# Patient Record
Sex: FEMALE | Race: WHITE | NOT HISPANIC OR LATINO | Employment: FULL TIME | URBAN - METROPOLITAN AREA
[De-identification: names, ages, dates, MRNs, and addresses within clinical notes are randomized per-mention and may not be internally consistent; named-entity substitution may affect disease eponyms.]

---

## 2018-03-07 ENCOUNTER — HOSPITAL ENCOUNTER (INPATIENT)
Facility: HOSPITAL | Age: 68
LOS: 1 days | Discharge: HOME | End: 2018-03-08
Attending: EMERGENCY MEDICINE | Admitting: INTERNAL MEDICINE

## 2018-03-07 ENCOUNTER — APPOINTMENT (OUTPATIENT)
Dept: CT IMAGING | Facility: HOSPITAL | Age: 68
End: 2018-03-07

## 2018-03-07 ENCOUNTER — APPOINTMENT (OUTPATIENT)
Dept: GENERAL RADIOLOGY | Facility: HOSPITAL | Age: 68
End: 2018-03-07

## 2018-03-07 ENCOUNTER — APPOINTMENT (OUTPATIENT)
Dept: CARDIOLOGY | Facility: HOSPITAL | Age: 68
End: 2018-03-07
Attending: INTERNAL MEDICINE

## 2018-03-07 ENCOUNTER — APPOINTMENT (OUTPATIENT)
Dept: CT IMAGING | Facility: HOSPITAL | Age: 68
End: 2018-03-07
Attending: INTERNAL MEDICINE

## 2018-03-07 DIAGNOSIS — I60.9 SUBARACHNOID BLEED (HCC): ICD-10-CM

## 2018-03-07 DIAGNOSIS — R55 SYNCOPE, CARDIOGENIC: Primary | ICD-10-CM

## 2018-03-07 PROBLEM — S06.6X1A TRAUMATIC SUBARACHNOID HEMORRHAGE WITH LOSS OF CONSCIOUSNESS OF 30 MINUTES OR LESS (HCC): Status: ACTIVE | Noted: 2018-03-07

## 2018-03-07 LAB
ALBUMIN SERPL-MCNC: 4.1 G/DL (ref 3.5–5.2)
ALBUMIN/GLOB SERPL: 1.5 G/DL
ALP SERPL-CCNC: 57 U/L (ref 39–117)
ALT SERPL W P-5'-P-CCNC: 26 U/L (ref 1–33)
ANION GAP SERPL CALCULATED.3IONS-SCNC: 9 MMOL/L
AST SERPL-CCNC: 23 U/L (ref 1–32)
BASOPHILS # BLD AUTO: 0.01 10*3/MM3 (ref 0–0.2)
BASOPHILS NFR BLD AUTO: 0.1 % (ref 0–1.5)
BILIRUB SERPL-MCNC: 0.5 MG/DL (ref 0.1–1.2)
BILIRUB UR QL STRIP: NEGATIVE
BUN BLD-MCNC: 18 MG/DL (ref 8–23)
BUN/CREAT SERPL: 25 (ref 7–25)
CALCIUM SPEC-SCNC: 9 MG/DL (ref 8.6–10.5)
CHLORIDE SERPL-SCNC: 105 MMOL/L (ref 98–107)
CLARITY UR: ABNORMAL
CO2 SERPL-SCNC: 28 MMOL/L (ref 22–29)
COLOR UR: YELLOW
CREAT BLD-MCNC: 0.72 MG/DL (ref 0.57–1)
D DIMER PPP FEU-MCNC: 2.57 MCGFEU/ML (ref 0–0.49)
DEPRECATED RDW RBC AUTO: 46.3 FL (ref 37–54)
EOSINOPHIL # BLD AUTO: 0.06 10*3/MM3 (ref 0–0.7)
EOSINOPHIL NFR BLD AUTO: 0.8 % (ref 0.3–6.2)
ERYTHROCYTE [DISTWIDTH] IN BLOOD BY AUTOMATED COUNT: 13.6 % (ref 11.7–13)
GFR SERPL CREATININE-BSD FRML MDRD: 81 ML/MIN/1.73
GLOBULIN UR ELPH-MCNC: 2.7 GM/DL
GLUCOSE BLD-MCNC: 105 MG/DL (ref 65–99)
GLUCOSE BLDC GLUCOMTR-MCNC: 102 MG/DL (ref 70–130)
GLUCOSE BLDC GLUCOMTR-MCNC: 121 MG/DL (ref 70–130)
GLUCOSE BLDC GLUCOMTR-MCNC: 87 MG/DL (ref 70–130)
GLUCOSE BLDC GLUCOMTR-MCNC: 88 MG/DL (ref 70–130)
GLUCOSE BLDC GLUCOMTR-MCNC: 98 MG/DL (ref 70–130)
GLUCOSE UR STRIP-MCNC: NEGATIVE MG/DL
HCT VFR BLD AUTO: 42.7 % (ref 35.6–45.5)
HGB BLD-MCNC: 13.6 G/DL (ref 11.9–15.5)
HGB UR QL STRIP.AUTO: NEGATIVE
IMM GRANULOCYTES # BLD: 0 10*3/MM3 (ref 0–0.03)
IMM GRANULOCYTES NFR BLD: 0 % (ref 0–0.5)
KETONES UR QL STRIP: NEGATIVE
LEUKOCYTE ESTERASE UR QL STRIP.AUTO: NEGATIVE
LYMPHOCYTES # BLD AUTO: 0.87 10*3/MM3 (ref 0.9–4.8)
LYMPHOCYTES NFR BLD AUTO: 11.3 % (ref 19.6–45.3)
MAGNESIUM SERPL-MCNC: 2.2 MG/DL (ref 1.6–2.4)
MCH RBC QN AUTO: 29.8 PG (ref 26.9–32)
MCHC RBC AUTO-ENTMCNC: 31.9 G/DL (ref 32.4–36.3)
MCV RBC AUTO: 93.6 FL (ref 80.5–98.2)
MONOCYTES # BLD AUTO: 0.52 10*3/MM3 (ref 0.2–1.2)
MONOCYTES NFR BLD AUTO: 6.8 % (ref 5–12)
NEUTROPHILS # BLD AUTO: 6.24 10*3/MM3 (ref 1.9–8.1)
NEUTROPHILS NFR BLD AUTO: 81 % (ref 42.7–76)
NITRITE UR QL STRIP: NEGATIVE
PH UR STRIP.AUTO: 7 [PH] (ref 5–8)
PHOSPHATE SERPL-MCNC: 3.8 MG/DL (ref 2.5–4.5)
PLATELET # BLD AUTO: 159 10*3/MM3 (ref 140–500)
PMV BLD AUTO: 11.8 FL (ref 6–12)
POTASSIUM BLD-SCNC: 4 MMOL/L (ref 3.5–5.2)
PROT SERPL-MCNC: 6.8 G/DL (ref 6–8.5)
PROT UR QL STRIP: NEGATIVE
RBC # BLD AUTO: 4.56 10*6/MM3 (ref 3.9–5.2)
SODIUM BLD-SCNC: 142 MMOL/L (ref 136–145)
SP GR UR STRIP: 1.01 (ref 1–1.03)
TROPONIN T SERPL-MCNC: <0.01 NG/ML (ref 0–0.03)
UROBILINOGEN UR QL STRIP: ABNORMAL
WBC NRBC COR # BLD: 7.7 10*3/MM3 (ref 4.5–10.7)

## 2018-03-07 PROCEDURE — 93010 ELECTROCARDIOGRAM REPORT: CPT | Performed by: INTERNAL MEDICINE

## 2018-03-07 PROCEDURE — 71046 X-RAY EXAM CHEST 2 VIEWS: CPT

## 2018-03-07 PROCEDURE — 70496 CT ANGIOGRAPHY HEAD: CPT

## 2018-03-07 PROCEDURE — 99291 CRITICAL CARE FIRST HOUR: CPT

## 2018-03-07 PROCEDURE — 70450 CT HEAD/BRAIN W/O DYE: CPT

## 2018-03-07 PROCEDURE — 36415 COLL VENOUS BLD VENIPUNCTURE: CPT

## 2018-03-07 PROCEDURE — 93005 ELECTROCARDIOGRAM TRACING: CPT | Performed by: EMERGENCY MEDICINE

## 2018-03-07 PROCEDURE — 99254 IP/OBS CNSLTJ NEW/EST MOD 60: CPT | Performed by: INTERNAL MEDICINE

## 2018-03-07 PROCEDURE — 82962 GLUCOSE BLOOD TEST: CPT

## 2018-03-07 PROCEDURE — 84484 ASSAY OF TROPONIN QUANT: CPT | Performed by: EMERGENCY MEDICINE

## 2018-03-07 PROCEDURE — 83735 ASSAY OF MAGNESIUM: CPT | Performed by: EMERGENCY MEDICINE

## 2018-03-07 PROCEDURE — 99254 IP/OBS CNSLTJ NEW/EST MOD 60: CPT | Performed by: NURSE PRACTITIONER

## 2018-03-07 PROCEDURE — 99285 EMERGENCY DEPT VISIT HI MDM: CPT

## 2018-03-07 PROCEDURE — 84100 ASSAY OF PHOSPHORUS: CPT | Performed by: EMERGENCY MEDICINE

## 2018-03-07 PROCEDURE — 81003 URINALYSIS AUTO W/O SCOPE: CPT | Performed by: EMERGENCY MEDICINE

## 2018-03-07 PROCEDURE — 80053 COMPREHEN METABOLIC PANEL: CPT | Performed by: EMERGENCY MEDICINE

## 2018-03-07 PROCEDURE — 85379 FIBRIN DEGRADATION QUANT: CPT | Performed by: EMERGENCY MEDICINE

## 2018-03-07 PROCEDURE — 93306 TTE W/DOPPLER COMPLETE: CPT | Performed by: INTERNAL MEDICINE

## 2018-03-07 PROCEDURE — 73610 X-RAY EXAM OF ANKLE: CPT

## 2018-03-07 PROCEDURE — 93306 TTE W/DOPPLER COMPLETE: CPT

## 2018-03-07 PROCEDURE — 85025 COMPLETE CBC W/AUTO DIFF WBC: CPT | Performed by: EMERGENCY MEDICINE

## 2018-03-07 PROCEDURE — 0 IOPAMIDOL 61 % SOLUTION: Performed by: INTERNAL MEDICINE

## 2018-03-07 RX ORDER — ONDANSETRON 2 MG/ML
4 INJECTION INTRAMUSCULAR; INTRAVENOUS EVERY 6 HOURS PRN
Status: DISCONTINUED | OUTPATIENT
Start: 2018-03-07 | End: 2018-03-08 | Stop reason: HOSPADM

## 2018-03-07 RX ORDER — SODIUM CHLORIDE 0.9 % (FLUSH) 0.9 %
10 SYRINGE (ML) INJECTION AS NEEDED
Status: DISCONTINUED | OUTPATIENT
Start: 2018-03-07 | End: 2018-03-08 | Stop reason: HOSPADM

## 2018-03-07 RX ORDER — SODIUM CHLORIDE 9 MG/ML
100 INJECTION, SOLUTION INTRAVENOUS CONTINUOUS
Status: DISCONTINUED | OUTPATIENT
Start: 2018-03-07 | End: 2018-03-08 | Stop reason: HOSPADM

## 2018-03-07 RX ORDER — SODIUM CHLORIDE 0.9 % (FLUSH) 0.9 %
1-10 SYRINGE (ML) INJECTION AS NEEDED
Status: DISCONTINUED | OUTPATIENT
Start: 2018-03-07 | End: 2018-03-08 | Stop reason: HOSPADM

## 2018-03-07 RX ORDER — ONDANSETRON 4 MG/1
4 TABLET, FILM COATED ORAL EVERY 6 HOURS PRN
Status: DISCONTINUED | OUTPATIENT
Start: 2018-03-07 | End: 2018-03-08 | Stop reason: HOSPADM

## 2018-03-07 RX ORDER — ACETAMINOPHEN 650 MG/1
650 SUPPOSITORY RECTAL EVERY 4 HOURS PRN
Status: DISCONTINUED | OUTPATIENT
Start: 2018-03-07 | End: 2018-03-08 | Stop reason: HOSPADM

## 2018-03-07 RX ORDER — ONDANSETRON 4 MG/1
4 TABLET, ORALLY DISINTEGRATING ORAL EVERY 6 HOURS PRN
Status: DISCONTINUED | OUTPATIENT
Start: 2018-03-07 | End: 2018-03-08 | Stop reason: HOSPADM

## 2018-03-07 RX ORDER — BISACODYL 5 MG/1
5 TABLET, DELAYED RELEASE ORAL DAILY PRN
Status: DISCONTINUED | OUTPATIENT
Start: 2018-03-07 | End: 2018-03-08 | Stop reason: HOSPADM

## 2018-03-07 RX ORDER — ACETAMINOPHEN 325 MG/1
650 TABLET ORAL EVERY 4 HOURS PRN
Status: DISCONTINUED | OUTPATIENT
Start: 2018-03-07 | End: 2018-03-08 | Stop reason: HOSPADM

## 2018-03-07 RX ORDER — BISACODYL 10 MG
10 SUPPOSITORY, RECTAL RECTAL DAILY PRN
Status: DISCONTINUED | OUTPATIENT
Start: 2018-03-07 | End: 2018-03-08 | Stop reason: HOSPADM

## 2018-03-07 RX ADMIN — SODIUM CHLORIDE 100 ML/HR: 9 INJECTION, SOLUTION INTRAVENOUS at 06:45

## 2018-03-07 RX ADMIN — ACETAMINOPHEN 650 MG: 325 TABLET ORAL at 17:51

## 2018-03-07 RX ADMIN — IOPAMIDOL 95 ML: 612 INJECTION, SOLUTION INTRAVENOUS at 13:38

## 2018-03-07 NOTE — PLAN OF CARE
Problem: Patient Care Overview (Adult)  Goal: Plan of Care Review  Outcome: Ongoing (interventions implemented as appropriate)   03/07/18 0613   Coping/Psychosocial Response Interventions   Plan Of Care Reviewed With patient;spouse   Outcome Evaluation   Outcome Summary/Follow up Plan Pt was in kitchen and fell,  was home and found her on the ground with LOC present and incontinence of urine. Pt stated that she has a history of hypotension and also fell one day prior in the garage and hurt her ankle. Xray did not show fracture, CT showed small subarachnoid hemorrage. Alert and orientated X4, no history of medical problems or alergies, no home meds, just vitamins, NIH 0. WIll do follow up CT this morning and will continue to monitor closely.      Goal: Adult Individualization and Mutuality  Outcome: Ongoing (interventions implemented as appropriate)    Goal: Discharge Needs Assessment  Outcome: Ongoing (interventions implemented as appropriate)      Problem: Skin Integrity Impairment, Risk/Actual (Adult)  Goal: Identify Related Risk Factors and Signs and Symptoms  Outcome: Ongoing (interventions implemented as appropriate)    Goal: Skin Integrity/Wound Healing  Outcome: Ongoing (interventions implemented as appropriate)      Problem: Fall Risk (Adult)  Goal: Identify Related Risk Factors and Signs and Symptoms  Outcome: Ongoing (interventions implemented as appropriate)    Goal: Absence of Falls  Outcome: Ongoing (interventions implemented as appropriate)      Problem: Pain, Acute (Adult)  Goal: Identify Related Risk Factors and Signs and Symptoms  Outcome: Ongoing (interventions implemented as appropriate)    Goal: Acceptable Pain Control/Comfort Level  Outcome: Ongoing (interventions implemented as appropriate)
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
## 2018-03-07 NOTE — PROGRESS NOTES
[facility] follow up:  Chart reviewed, admitted by Dr Boyle at 5AM.   Cardiology consulted, discussed with Dr Parham.  Appreciate insight and care. Neurosurgery consulted.  Yuan Riddle MD  [facility]  03/07/18  11:43 AM
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
## 2018-03-07 NOTE — PLAN OF CARE
Problem: Patient Care Overview (Adult)  Goal: Plan of Care Review  Outcome: Ongoing (interventions implemented as appropriate)   03/07/18 1754   Coping/Psychosocial Response Interventions   Plan Of Care Reviewed With patient;spouse   Outcome Evaluation   Outcome Summary/Follow up Plan Pt had repeat CT and CTA today to check for progression of SAH and possible aneurysm. CT showed improving SAH and no other complications. Neursurgery signed off and okayed transfer to telemetry unit. Pt started on regular diet and up to chair with assist for dinner. C/o headache; treated with tylenol. Echo completed.    Patient Care Overview   Progress improving     Goal: Adult Individualization and Mutuality  Outcome: Ongoing (interventions implemented as appropriate)    Goal: Discharge Needs Assessment  Outcome: Ongoing (interventions implemented as appropriate)      Problem: Fall Risk (Adult)  Goal: Identify Related Risk Factors and Signs and Symptoms  Outcome: Ongoing (interventions implemented as appropriate)   03/07/18 1754   Fall Risk   Fall Risk: Signs and Symptoms presence of risk factors     Goal: Absence of Falls  Outcome: Ongoing (interventions implemented as appropriate)   03/07/18 1754   Fall Risk (Adult)   Absence of Falls making progress toward outcome       Problem: Pain, Acute (Adult)  Goal: Identify Related Risk Factors and Signs and Symptoms  Outcome: Ongoing (interventions implemented as appropriate)    Goal: Acceptable Pain Control/Comfort Level  Outcome: Ongoing (interventions implemented as appropriate)   03/07/18 1754   Pain, Acute (Adult)   Acceptable Pain Control/Comfort Level making progress toward outcome       Problem: Pressure Ulcer Risk (Meek Scale) (Adult,Obstetrics,Pediatric)  Goal: Identify Related Risk Factors and Signs and Symptoms  Outcome: Ongoing (interventions implemented as appropriate)    Goal: Skin Integrity  Outcome: Ongoing (interventions implemented as appropriate)   03/07/18 1754    Pressure Ulcer Risk (Emek Scale) (Adult,Obstetrics,Pediatric)   Skin Integrity making progress toward outcome       Problem: Stroke (Hemorrhagic) (Adult)  Goal: Signs and Symptoms of Listed Potential Problems Will be Absent or Manageable (Stroke)  Outcome: Ongoing (interventions implemented as appropriate)   03/07/18 8721   Stroke (Hemorrhagic)   Problems Assessed (Stroke (Hemorrhagic)) all   Problems Present (Stroke (Hemorrhagic)) none

## 2018-03-07 NOTE — DISCHARGE INSTRUCTIONS
Return to ER for sudden or severe or progressive headache; new onset seizures, weakness, numbness, speech or vision changes

## 2018-03-07 NOTE — ED NOTES
Pt had syncopal episode while making coffee around 100am this morning. Fell and LOC.     Skylar Schaffer RN  03/07/18 0211

## 2018-03-07 NOTE — ED PROVIDER NOTES
EMERGENCY DEPARTMENT ENCOUNTER    CHIEF COMPLAINT  Chief Complaint: Syncope  History given by: patient,   History limited by: n/a  Room Number: 13/13  PMD: Jackie Streeter MD      HPI:  Pt is a 67 y.o. female who presents after a syncopal episode tonight. Per pt's , the pt was up making coffee when her heard her fall. He states that she was unresponsive for several minutes. Pt had urinary incontinence during the episode. Pt is amnesic to event. Currently, pt complains of left ankle pain s/t a mechanical fall yesterday. Her  states that the pt struck her head. She reports lightheadedness, but denies nausea, vomiting, CP, palpitations, or any other sx. Pt denies hx of similar episodes, but states that she has a hx of hypotension     Duration:  Prior to arrival   Onset: sudden  Timing: single episode   Location: neuro  Radiation: n/a  Quality: syncope and collapse   Intensity/Severity: moderate  Progression: resolved   Associated Symptoms: left ankle pain, lightheadedness  Aggravating Factors: none  Alleviating Factors: none  Previous Episodes: hx of hypotension   Treatment before arrival: none    PAST MEDICAL HISTORY  Active Ambulatory Problems     Diagnosis Date Noted   • No Active Ambulatory Problems     Resolved Ambulatory Problems     Diagnosis Date Noted   • No Resolved Ambulatory Problems     No Additional Past Medical History       PAST SURGICAL HISTORY  History reviewed. No pertinent surgical history.    FAMILY HISTORY  History reviewed. No pertinent family history.    SOCIAL HISTORY  Social History     Social History   • Marital status:      Spouse name: N/A   • Number of children: N/A   • Years of education: N/A     Occupational History   • Not on file.     Social History Main Topics   • Smoking status: Never Smoker   • Smokeless tobacco: Never Used   • Alcohol use No   • Drug use: No   • Sexual activity: Defer     Other Topics Concern   • Not on file     Social History  Narrative   • No narrative on file       ALLERGIES  Review of patient's allergies indicates no known allergies.    REVIEW OF SYSTEMS  Review of Systems   Constitutional: Negative for fever.   HENT: Negative for sore throat.    Eyes: Negative.    Respiratory: Negative for cough and shortness of breath.    Cardiovascular: Negative for chest pain.   Gastrointestinal: Negative for abdominal pain, diarrhea and vomiting.   Genitourinary: Negative for dysuria.   Musculoskeletal: Positive for arthralgias (left ankle). Negative for neck pain.   Skin: Negative for rash.   Allergic/Immunologic: Negative.    Neurological: Positive for syncope and light-headedness. Negative for weakness, numbness and headaches.   Hematological: Negative.    Psychiatric/Behavioral: Negative.    All other systems reviewed and are negative.      PHYSICAL EXAM  ED Triage Vitals   Temp Heart Rate Resp BP SpO2   03/07/18 0158 03/07/18 0157 03/07/18 0157 03/07/18 0157 03/07/18 0157   96.7 °F (35.9 °C) 66 18 101/71 99 %      Temp src Heart Rate Source Patient Position BP Location FiO2 (%)   03/07/18 0158 03/07/18 0157 -- -- --   Tympanic Monitor          Physical Exam   Constitutional: She is oriented to person, place, and time and well-developed, well-nourished, and in no distress. No distress.   HENT:   Head: Normocephalic and atraumatic.   Eyes: EOM are normal. Pupils are equal, round, and reactive to light.   Neck: Normal range of motion. Neck supple.   Cardiovascular: Normal rate, regular rhythm and normal heart sounds.    Pulmonary/Chest: Effort normal and breath sounds normal. No respiratory distress.   Abdominal: Soft. There is no tenderness. There is no rebound and no guarding.   Musculoskeletal: Normal range of motion. She exhibits no edema.        Left lower leg: She exhibits tenderness.   Neurological: She is alert and oriented to person, place, and time. She has normal sensation and normal strength.   Skin: Skin is warm and dry. No rash  noted.   Psychiatric: Mood and affect normal.   Nursing note and vitals reviewed.      LAB RESULTS  Lab Results (last 24 hours)     ** No results found for the last 24 hours. **          I ordered the above labs and reviewed the results     RADIOLOGY  XR Chest 2 View   Preliminary Result   No acute findings.              CT Head Without Contrast   Preliminary Result   Small amount of subarachnoid blood layers the high left frontal lobe,   lower right frontal lobe and the right temporal lobe.    0.6 cm hyperdense lesion of the right frontal lobe anterior to the right   lateral ventricle of indeterminate significance.   Further evaluation with MRI is recommended when the patient is more   stable.       Findings called to Dr. Rand, 2:51 AM.          XR Ankle 3+ View Left    (Results Pending)        I ordered the above noted radiological studies. Interpreted by radiologist. Discussed with radiologist (Dr Bethea). Reviewed by me in PACS.       PROCEDURES  Critical Care  Performed by: JOSEPHINE RAND  Authorized by: JOSEPHINE RAND     Critical care provider statement:     Critical care time (minutes):  30    Critical care time was exclusive of:  Separately billable procedures and treating other patients and teaching time    Critical care was necessary to treat or prevent imminent or life-threatening deterioration of the following conditions:  CNS failure or compromise    Critical care was time spent personally by me on the following activities:  Discussions with consultants, development of treatment plan with patient or surrogate, evaluation of patient's response to treatment, examination of patient, obtaining history from patient or surrogate, ordering and performing treatments and interventions, ordering and review of laboratory studies, ordering and review of radiographic studies and re-evaluation of patient's condition          EKG           EKG time: 02:58  Rhythm/Rate: NSR 65  P waves and AR: nml  QRS, axis: nml    ST and T waves: nml     Interpreted Contemporaneously by me, independently viewed  No prior     PROGRESS AND CONSULTS  ED Course     02;21  BP- 101/71 HR- 66 Temp- 96.7 °F (35.9 °C) (Tympanic) O2 sat- 99%  Adivsed pt of the plan fr labs and CT. Pt understands and agrees with the plan, all questions answered.    02;32  Magnesium, phosphorus, d-dimer, troponin, UA, CMP, CBC, CXR, CT head, and EKG ordered.     02:57  Call placed to pulmonology and neurosurgery for admission and consult.     02;58  Discussed pt's case with Dr Boyle (pulmonology), who agrees to admit the pt to the ICU    03;02  XR left ankle ordered.     03:03  Discussed pt's case with Dr Tabor (neursurgery), who agrees to consult.    03:31  BP- 111/73 HR- (!) 129 Temp- 96.7 °F (35.9 °C) (Tympanic) O2 sat- 98%  Rechecked the patient who is in NAD and is resting comfortably. Advised pt and  that the CT shows a subarachnoid hemorrhage. Informed them of the plan for admission. Pt understands and agrees with the plan, all questions answered.    MEDICAL DECISION MAKING  Results were reviewed/discussed with the patient and they were also made aware of online access. Pt also made aware that some labs, such as cultures, will not be resulted during ER visit and follow up with PMD is necessary.     MDM  Number of Diagnoses or Management Options  Syncope, cardiogenic:      Amount and/or Complexity of Data Reviewed  Clinical lab tests: ordered and reviewed  Tests in the radiology section of CPT®: ordered and reviewed (CT head shows a small amount of subarachnoid blood layers the high left frontal lobe, lower right frontal lobe and the right temporal lobe.   CXR shows NAD  )  Tests in the medicine section of CPT®: ordered and reviewed (See EKG procedure note. )  Discussion of test results with the performing providers: yes (Dr Bethea)  Discuss the patient with other providers: yes (Dr Boyle, pulmonology  Dr Tabor, neurosurgery)    Critical Care  Total time  providing critical care: 30-74 minutes    Patient Progress  Patient progress: stable         Jo and Plunkett Grade:  The Jo and Plunkett grade for this patient is: Grade 1: Asymptomatic or mild headache and slight nuchal rigidity at 2:59 AM on 03/07/18.    DIAGNOSIS  Final diagnoses:   Syncope, cardiogenic   Subarachnoid bleed       DISPOSITION  ADMISSION    Discussed treatment plan and reason for admission with pt/family and admitting physician.  Pt/family voiced understanding of the plan for admission for further testing/treatment as needed.     Latest Documented Vital Signs:  As of 3:06 AM  BP- 123/75 HR- 71 Temp- 96.7 °F (35.9 °C) (Tympanic) O2 sat- 95%    --  Documentation assistance provided by julius Odell for Dr Luna.  Information recorded by the scrhector was done at my direction and has been verified and validated by me.       Ariane Odell  03/07/18 0332       Johnathan Luna MD  03/07/18 2684

## 2018-03-07 NOTE — H&P
History and Physical    Patient Name: Marcie Ricketts  Age/Sex: 67 y.o. female  : 1950  MRN: [id number]    Date of Admission: 3/7/2018  Date of Encounter Visit: 18  Encounter Provider: Minerva Boyle MD  Referring Provider: Minerva Boyle MD  Place of Service: [facility]   Patient Care Team:  Jackie Streeter MD as PCP - General (Internal Medicine)      Subjective:     Chief Complaint: Syncopal episode    History of Present Illness:  Marcie Ricketst is a 67 y.o. female with no prior neurological history he had a sudden onset unprovoked syncopal episode.   Patient was walking her car yesterday and she was trying to get out of the car and she fell and hit her head and hurt her left ankle but she had no loss of consciousness at that time.   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  This was associated with urinary incontinence but there was no motor seizure activity.  Patient herself has no recollection of the events.  Patient did hurt her ankle and she was complaining of ankle pain, patient did hit her head per the 's report and she was complaining of mild lightheadedness but no nausea or vomiting or chest pain or palpitation.  Patient has no underlying history of any arrhythmia or previous syncopal episodes or any history of seizure.  Patient does have history of hypotension which can be a possibly a trigger.  The blood pressure was within normal range on assessment in the emergency room    Pulmonary Functions Testing Results:    No results found for: FEV1, FVC, DEU1PTO, TLC, DLCO    Review of Systems:   Review of Systems  Constitutional: Negative for fever.   HENT: Negative for sore throat.    Eyes: Negative.    Respiratory: Negative for cough and shortness of breath.    Cardiovascular: Negative for chest pain.   Gastrointestinal: Negative for abdominal pain, diarrhea and  vomiting.   Genitourinary: Negative for dysuria.   Musculoskeletal: Positive for arthralgias (left ankle). Negative for neck pain.   Skin: Negative for rash.   Allergic/Immunologic: Negative.    Neurological: Positive for syncope and light-headedness. Negative for weakness, numbness and headaches.   Hematological: Negative.    Psychiatric/Behavioral: Negative.    All other systems reviewed and are negative.  Past Medical History:  History reviewed. No pertinent past medical history.  History of hypotension  History reviewed. No pertinent surgical history.    Home Medications:   Prescriptions Prior to Admission   Medication Sig Dispense Refill Last Dose   • CALCIUM PO Take 1 tablet by mouth Daily.      • Multiple Vitamins-Minerals (MULTIVITAMIN ADULT PO) Take 1 tablet by mouth Daily.      • VITAMIN E PO Take 1 tablet by mouth Daily.          Inpatient Medications:  Scheduled Meds:   Continuous Infusions:   PRN Meds:.•  Insert peripheral IV **AND** sodium chloride    Allergies:  No Known Allergies    Past Social History:  Social History     Social History   • Marital status:      Social History Main Topics   • Smoking status: Never Smoker   • Smokeless tobacco: Never Used   • Alcohol use No   • Drug use: No   • Sexual activity: Defer       Past Family History:  History reviewed. No pertinent family history.        Objective:   Temp:  [96.7 °F (35.9 °C)] 96.7 °F (35.9 °C)  Heart Rate:  [] 61  Resp:  [16-18] 18  BP: (101-133)/(69-85) 116/69   SpO2:  [95 %-99 %] 96 %  on  Flow (L/min):  [2] 2 O2 Device: room air  No intake or output data in the 24 hours ending 03/07/18 0501  Body mass index is 22.14 kg/(m^2).  Last 3 weights    03/07/18  0158   Weight: 56.7 kg (125 lb)     Weight change:     Physical Exam:   Physical Exam   General:    No acute distress, alert and oriented x4, pleasant                   Head:    Normocephalic, atraumatic.   Eyes:          Conjunctivae and sclerae normal, no icterus, PERRLA    Throat:   No oral lesions, no thrush, oral mucosa moist.    Neck:   Supple, trachea midline.   Lungs:     Normal chest on inspection, CTAB, no wheezes. No rhonchi. No crackles. Respirations regular, even and unlabored.     Heart:    Regular rhythm and normal rate.  No murmurs, gallops, or rubs noted.   Abdomen:     Soft, non-tender, non-distended, positive bowel sounds.    Extremities:   No clubbing, cyanosis, or edema.     Pulses:   Pulses palpable and equal bilaterally.    Skin:   No bleeding or rash.   Neuro:   Non-focal.  Moves all extremities well.    Psychiatric:   Normal mood and affect.     Lab Review:     Results from last 7 days  Lab Units 03/07/18  0304   SODIUM mmol/L 142   POTASSIUM mmol/L 4.0   CHLORIDE mmol/L 105   CO2 mmol/L 28.0   BUN mg/dL 18   CREATININE mg/dL 0.72   GLUCOSE mg/dL 105*   CALCIUM mg/dL 9.0   AST (SGOT) U/L 23   ALT (SGPT) U/L 26   ALBUMIN g/dL 4.10       Results from last 7 days  Lab Units 03/07/18  0304   TROPONIN T ng/mL <0.010       Results from last 7 days  Lab Units 03/07/18  0304   WBC 10*3/mm3 7.70   HEMOGLOBIN g/dL 13.6   HEMATOCRIT % 42.7   PLATELETS 10*3/mm3 159   MCV fL 93.6   MCH pg 29.8   MCHC g/dL 31.9*   RDW % 13.6*   RDW-SD fl 46.3   MPV fL 11.8   NEUTROPHIL % % 81.0*   LYMPHOCYTE % % 11.3*   MONOCYTES % % 6.8   EOSINOPHIL % % 0.8   BASOPHIL % % 0.1   IMM GRAN % % 0.0   NEUTROS ABS 10*3/mm3 6.24   LYMPHS ABS 10*3/mm3 0.87*   MONOS ABS 10*3/mm3 0.52   EOS ABS 10*3/mm3 0.06   BASOS ABS 10*3/mm3 0.01   IMMATURE GRANS (ABS) 10*3/mm3 0.00           Results from last 7 days  Lab Units 03/07/18  0304   MAGNESIUM mg/dL 2.2           Invalid input(s): LDLCALC                                Results from last 7 days  Lab Units 03/07/18  0317   NITRITE UA  Negative               Imaging:  Imaging Results (most recent)     Procedure Component Value Units Date/Time    CT Head Without Contrast [[id number]] Collected:  03/07/18 0253     Updated:  03/07/18 0254    Narrative:        CT SCAN OF THE BRAIN WITHOUT CONTRAST.     TECHNIQUE: Radiation dose reduction techniques were utilized, including  automated exposure control and exposure modulation based on body size.  Multiple axial images of the brain were obtained from the vertex to the  base of the brain.     HISTORY: Fainting and syncope.     COMPARISON: No prior studies for comparison.     FINDINGS:   Small amount of subarachnoid hemorrhage layers the high left frontal  lobe. Small amount of hemorrhage is also seen layering the inferior  right frontal lobe in the right temporal lobe. The 0.6 cm focus anterior  to the right lateral ventricle image 28 measures 0.6 cm. No evidence for  ventricular hemorrhage.     Midline structures are within normal limits, there is no hydrocephalus.  Gray-white matter differentiation is maintained.         Orbits are within normal limits. No significant mucosal disease of the  para-nasal sinuses. Mastoid air cells are well aerated.              Impression:       Small amount of subarachnoid blood layers the high left frontal lobe,  lower right frontal lobe and the right temporal lobe.   0.6 cm hyperdense lesion of the right frontal lobe anterior to the right  lateral ventricle of indeterminate significance.  Further evaluation with MRI is recommended when the patient is more  stable.     Findings called to Dr. Luna, 2:51 AM.       XR Chest 2 View [[id number]] Collected:  03/07/18 0259     Updated:  03/07/18 0259    Narrative:       CHEST PA AND LATERAL.     HISTORY: Trauma.     COMPARISON: No prior studies for comparison.     FINDINGS:  Cardiomediastinal silhouette is within normal limits.         There is no consolidation or effusion.              Impression:       No acute findings.           XR Ankle 3+ View Left [[id number]] Collected:  03/07/18 0338     Updated:  03/07/18 0338    Narrative:       LEFT ANKLE 3 VIEWS.     HISTORY: Ankle pain, fall.     COMPARISON: No prior studies for comparison.      FINDINGS:  There is no fracture or dislocation.      Mild soft tissue swelling.       Impression:       No acute fracture or dislocation.                 I personally viewed and interpreted the patient's imaging studies.    Assessment:   1. Traumatic subarachnoid hemorrhage  2. Syncopal episode for further evaluation        Plan:     Consultation and evaluation by neurosurgery, since bilateral subarachnoid hemorrhage, this is preceded by a loss of consciousness and trauma and unlikely to be aneurysm related.  Neurosurgery on board, no intervention at this point with follow-up CT imaging  Workup of syncope, we'll consult cardiology, patient has history of hypertension and this might be as simple as an orthostatic hypotension episode however other more serious etiologies need to be evaluated and ruled out  Patient is not septic, patient has no renal problems, no leukocytosis  Mechanical DVT prophylaxis, no need for stress ulcer prophylaxis    Discussed with patient and discussed with ER team and with the ICU staff    Minerva Boyle MD  [facility]   03/07/18  5:01 AM    Dictated utilizing Dragon dictation:  Much of this encounter note is an electronic transcription/translation of spoken language to printed text. The electronic translation of spoken language may permit erroneous, or at times, nonsensical words or phrases to be inadvertently transcribed; Although I have reviewed the note for such errors, some may still exist.

## 2018-03-07 NOTE — ED TRIAGE NOTES
Ems reports call was for syncopal episode.  told EMS that pt was up making coffee when he heard her fall on the floor.   reports pt was unconscious for a few minutes.  Pt did loose control of her bladder.  Pt doesn't remember the episode.  Pt complains of left ankle pain.  EMS placed pt in c-collar.

## 2018-03-08 ENCOUNTER — APPOINTMENT (OUTPATIENT)
Dept: CARDIOLOGY | Facility: HOSPITAL | Age: 68
End: 2018-03-08
Attending: INTERNAL MEDICINE

## 2018-03-08 VITALS
BODY MASS INDEX: 22.15 KG/M2 | HEIGHT: 63 IN | TEMPERATURE: 98 F | OXYGEN SATURATION: 98 % | WEIGHT: 125 LBS | DIASTOLIC BLOOD PRESSURE: 58 MMHG | SYSTOLIC BLOOD PRESSURE: 110 MMHG | RESPIRATION RATE: 14 BRPM | HEART RATE: 73 BPM

## 2018-03-08 LAB
AORTIC ARCH: 2.1 CM
AORTIC DIMENSIONLESS INDEX: 0.7 (DI)
ASCENDING AORTA: 2.4 CM
BH CV ECHO MEAS - ACS: 1.8 CM
BH CV ECHO MEAS - AO ARCH DIAM (PROXIMAL TRANS.): 2.1 CM
BH CV ECHO MEAS - AO MAX PG (FULL): 9.6 MMHG
BH CV ECHO MEAS - AO MAX PG: 17 MMHG
BH CV ECHO MEAS - AO MEAN PG (FULL): 6 MMHG
BH CV ECHO MEAS - AO MEAN PG: 10 MMHG
BH CV ECHO MEAS - AO ROOT AREA (BSA CORRECTED): 1.8
BH CV ECHO MEAS - AO ROOT AREA: 6.2 CM^2
BH CV ECHO MEAS - AO ROOT DIAM: 2.8 CM
BH CV ECHO MEAS - AO V2 MAX: 206 CM/SEC
BH CV ECHO MEAS - AO V2 MEAN: 147 CM/SEC
BH CV ECHO MEAS - AO V2 VTI: 43.8 CM
BH CV ECHO MEAS - ASC AORTA: 2.4 CM
BH CV ECHO MEAS - AVA(I,A): 1.8 CM^2
BH CV ECHO MEAS - AVA(I,D): 1.8 CM^2
BH CV ECHO MEAS - AVA(V,A): 1.9 CM^2
BH CV ECHO MEAS - AVA(V,D): 1.9 CM^2
BH CV ECHO MEAS - BSA(HAYCOCK): 1.6 M^2
BH CV ECHO MEAS - BSA: 1.6 M^2
BH CV ECHO MEAS - BZI_BMI: 22.1 KILOGRAMS/M^2
BH CV ECHO MEAS - BZI_METRIC_HEIGHT: 160 CM
BH CV ECHO MEAS - BZI_METRIC_WEIGHT: 56.7 KG
BH CV ECHO MEAS - CONTRAST EF (2CH): 57.1 ML/M^2
BH CV ECHO MEAS - CONTRAST EF 4CH: 52.4 ML/M^2
BH CV ECHO MEAS - EDV(CUBED): 68.9 ML
BH CV ECHO MEAS - EDV(MOD-SP2): 42 ML
BH CV ECHO MEAS - EDV(MOD-SP4): 42 ML
BH CV ECHO MEAS - EDV(TEICH): 74.2 ML
BH CV ECHO MEAS - EF(CUBED): 68.1 %
BH CV ECHO MEAS - EF(MOD-SP2): 57.1 %
BH CV ECHO MEAS - EF(MOD-SP4): 52.4 %
BH CV ECHO MEAS - EF(TEICH): 60.2 %
BH CV ECHO MEAS - ESV(CUBED): 22 ML
BH CV ECHO MEAS - ESV(MOD-SP2): 18 ML
BH CV ECHO MEAS - ESV(MOD-SP4): 20 ML
BH CV ECHO MEAS - ESV(TEICH): 29.6 ML
BH CV ECHO MEAS - FS: 31.7 %
BH CV ECHO MEAS - IVS/LVPW: 1.1
BH CV ECHO MEAS - IVSD: 1 CM
BH CV ECHO MEAS - LAT PEAK E' VEL: 11 CM/SEC
BH CV ECHO MEAS - LV DIASTOLIC VOL/BSA (35-75): 26.5 ML/M^2
BH CV ECHO MEAS - LV MASS(C)D: 123 GRAMS
BH CV ECHO MEAS - LV MASS(C)DI: 77.6 GRAMS/M^2
BH CV ECHO MEAS - LV MAX PG: 7.4 MMHG
BH CV ECHO MEAS - LV MEAN PG: 4 MMHG
BH CV ECHO MEAS - LV SYSTOLIC VOL/BSA (12-30): 12.6 ML/M^2
BH CV ECHO MEAS - LV V1 MAX: 136 CM/SEC
BH CV ECHO MEAS - LV V1 MEAN: 97.1 CM/SEC
BH CV ECHO MEAS - LV V1 VTI: 27.7 CM
BH CV ECHO MEAS - LVIDD: 4.1 CM
BH CV ECHO MEAS - LVIDS: 2.8 CM
BH CV ECHO MEAS - LVLD AP2: 6.1 CM
BH CV ECHO MEAS - LVLD AP4: 5.9 CM
BH CV ECHO MEAS - LVLS AP2: 5.5 CM
BH CV ECHO MEAS - LVLS AP4: 5.7 CM
BH CV ECHO MEAS - LVOT AREA (M): 2.8 CM^2
BH CV ECHO MEAS - LVOT AREA: 2.8 CM^2
BH CV ECHO MEAS - LVOT DIAM: 1.9 CM
BH CV ECHO MEAS - LVPWD: 0.9 CM
BH CV ECHO MEAS - MED PEAK E' VEL: 9 CM/SEC
BH CV ECHO MEAS - MV A DUR: 0.11 SEC
BH CV ECHO MEAS - MV A MAX VEL: 93.6 CM/SEC
BH CV ECHO MEAS - MV DEC SLOPE: 260 CM/SEC^2
BH CV ECHO MEAS - MV DEC TIME: 0.18 SEC
BH CV ECHO MEAS - MV E MAX VEL: 74.7 CM/SEC
BH CV ECHO MEAS - MV E/A: 0.8
BH CV ECHO MEAS - MV MAX PG: 3.6 MMHG
BH CV ECHO MEAS - MV MEAN PG: 2 MMHG
BH CV ECHO MEAS - MV P1/2T MAX VEL: 89 CM/SEC
BH CV ECHO MEAS - MV P1/2T: 100.3 MSEC
BH CV ECHO MEAS - MV V2 MAX: 94.8 CM/SEC
BH CV ECHO MEAS - MV V2 MEAN: 64.1 CM/SEC
BH CV ECHO MEAS - MV V2 VTI: 34.5 CM
BH CV ECHO MEAS - MVA P1/2T LCG: 2.5 CM^2
BH CV ECHO MEAS - MVA(P1/2T): 2.2 CM^2
BH CV ECHO MEAS - MVA(VTI): 2.3 CM^2
BH CV ECHO MEAS - PA ACC TIME: 0.08 SEC
BH CV ECHO MEAS - PA MAX PG (FULL): 1.8 MMHG
BH CV ECHO MEAS - PA MAX PG: 4 MMHG
BH CV ECHO MEAS - PA PR(ACCEL): 42.6 MMHG
BH CV ECHO MEAS - PA V2 MAX: 100 CM/SEC
BH CV ECHO MEAS - PULM A REVS DUR: 0.11 SEC
BH CV ECHO MEAS - PULM A REVS VEL: 43 CM/SEC
BH CV ECHO MEAS - PULM DIAS VEL: 45.3 CM/SEC
BH CV ECHO MEAS - PULM S/D: 1.5
BH CV ECHO MEAS - PULM SYS VEL: 67.6 CM/SEC
BH CV ECHO MEAS - PVA(V,A): 2.1 CM^2
BH CV ECHO MEAS - PVA(V,D): 2.1 CM^2
BH CV ECHO MEAS - QP/QS: 0.6
BH CV ECHO MEAS - RAP SYSTOLE: 8 MMHG
BH CV ECHO MEAS - RV MAX PG: 2.2 MMHG
BH CV ECHO MEAS - RV MEAN PG: 1 MMHG
BH CV ECHO MEAS - RV V1 MAX: 73.9 CM/SEC
BH CV ECHO MEAS - RV V1 MEAN: 56.7 CM/SEC
BH CV ECHO MEAS - RV V1 VTI: 16.6 CM
BH CV ECHO MEAS - RVOT AREA: 2.8 CM^2
BH CV ECHO MEAS - RVOT DIAM: 1.9 CM
BH CV ECHO MEAS - RVSP: 33 MMHG
BH CV ECHO MEAS - SI(AO): 170.3 ML/M^2
BH CV ECHO MEAS - SI(CUBED): 29.7 ML/M^2
BH CV ECHO MEAS - SI(LVOT): 49.6 ML/M^2
BH CV ECHO MEAS - SI(MOD-SP2): 15.2 ML/M^2
BH CV ECHO MEAS - SI(MOD-SP4): 13.9 ML/M^2
BH CV ECHO MEAS - SI(TEICH): 28.2 ML/M^2
BH CV ECHO MEAS - SUP REN AO DIAM: 1.98 CM
BH CV ECHO MEAS - SV(AO): 269.7 ML
BH CV ECHO MEAS - SV(CUBED): 47 ML
BH CV ECHO MEAS - SV(LVOT): 78.5 ML
BH CV ECHO MEAS - SV(MOD-SP2): 24 ML
BH CV ECHO MEAS - SV(MOD-SP4): 22 ML
BH CV ECHO MEAS - SV(RVOT): 47.1 ML
BH CV ECHO MEAS - SV(TEICH): 44.7 ML
BH CV ECHO MEAS - TAPSE (>1.6): 2.54 CM2
BH CV ECHO MEAS - TR MAX VEL: 251 CM/SEC
BH CV VAS BP RIGHT ARM: NORMAL MMHG
BH CV XLRA - RV BASE: 2.54 CM
BH CV XLRA - TDI S': 14.3 CM/SEC
E/E' RATIO: 9
GLUCOSE BLDC GLUCOMTR-MCNC: 92 MG/DL (ref 70–130)
GLUCOSE BLDC GLUCOMTR-MCNC: 93 MG/DL (ref 70–130)
GLUCOSE BLDC GLUCOMTR-MCNC: 94 MG/DL (ref 70–130)
LEFT ATRIUM VOLUME INDEX: 31 ML/M2
LV EF 2D ECHO EST: 52 %
MAXIMAL PREDICTED HEART RATE: 154 BPM
STRESS TARGET HR: 131 BPM

## 2018-03-08 PROCEDURE — 93005 ELECTROCARDIOGRAM TRACING: CPT | Performed by: INTERNAL MEDICINE

## 2018-03-08 PROCEDURE — 99232 SBSQ HOSP IP/OBS MODERATE 35: CPT | Performed by: INTERNAL MEDICINE

## 2018-03-08 PROCEDURE — 93010 ELECTROCARDIOGRAM REPORT: CPT | Performed by: INTERNAL MEDICINE

## 2018-03-08 PROCEDURE — 0296T HC EXT ECG > 48HR TO 21 DAY RCRD W/CONECT INTL RCRD: CPT

## 2018-03-08 PROCEDURE — 82962 GLUCOSE BLOOD TEST: CPT

## 2018-03-08 PROCEDURE — 97161 PT EVAL LOW COMPLEX 20 MIN: CPT
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
## 2018-03-08 NOTE — PLAN OF CARE
Problem: Patient Care Overview (Adult)  Goal: Plan of Care Review  Outcome: Ongoing (interventions implemented as appropriate)      Problem: Fall Risk (Adult)  Goal: Identify Related Risk Factors and Signs and Symptoms  Outcome: Ongoing (interventions implemented as appropriate)    Goal: Absence of Falls  Outcome: Ongoing (interventions implemented as appropriate)      Problem: Pain, Acute (Adult)  Goal: Identify Related Risk Factors and Signs and Symptoms  Outcome: Ongoing (interventions implemented as appropriate)    Goal: Acceptable Pain Control/Comfort Level  Outcome: Ongoing (interventions implemented as appropriate)      Problem: Pressure Ulcer Risk (Meek Scale) (Adult,Obstetrics,Pediatric)  Goal: Identify Related Risk Factors and Signs and Symptoms  Outcome: Ongoing (interventions implemented as appropriate)      Problem: Stroke (Hemorrhagic) (Adult)  Goal: Signs and Symptoms of Listed Potential Problems Will be Absent or Manageable (Stroke)  Outcome: Ongoing (interventions implemented as appropriate)

## 2018-03-08 NOTE — PLAN OF CARE
Problem: Patient Care Overview (Adult)  Goal: Plan of Care Review  Outcome: Outcome(s) achieved Date Met: 03/08/18 03/08/18 1167   Coping/Psychosocial Response Interventions   Plan Of Care Reviewed With patient;spouse   Outcome Evaluation   Outcome Summary/Follow up Plan Neuro exam stable. Ambulates without assistance. Discharge to home with heart monitor in place for 2 weeks. No c/o pain. Safety maintained throughout hospital stay.    Patient Care Overview   Progress improving     Goal: Adult Individualization and Mutuality  Outcome: Outcome(s) achieved Date Met: 03/08/18      Problem: Fall Risk (Adult)  Goal: Identify Related Risk Factors and Signs and Symptoms  Outcome: Outcome(s) achieved Date Met: 03/08/18    Goal: Absence of Falls  Outcome: Outcome(s) achieved Date Met: 03/08/18      Problem: Pain, Acute (Adult)  Goal: Identify Related Risk Factors and Signs and Symptoms  Outcome: Outcome(s) achieved Date Met: 03/08/18    Goal: Acceptable Pain Control/Comfort Level  Outcome: Outcome(s) achieved Date Met: 03/08/18      Problem: Pressure Ulcer Risk (Meek Scale) (Adult,Obstetrics,Pediatric)  Goal: Identify Related Risk Factors and Signs and Symptoms  Outcome: Outcome(s) achieved Date Met: 03/08/18    Goal: Skin Integrity  Outcome: Outcome(s) achieved Date Met: 03/08/18      Problem: Stroke (Hemorrhagic) (Adult)  Goal: Signs and Symptoms of Listed Potential Problems Will be Absent or Manageable (Stroke)  Outcome: Outcome(s) achieved Date Met: 03/08/18

## 2018-03-08 NOTE — PAYOR COMM NOTE
"Marcie Decker (67 y.o. Female)                     ATTENTION;   CLINICAL REVIEW, AUTH PENDING [id number], PATIENT ADMITTED TO ICU LEVEL OF CARE                  REPLY TO UR DEPT, JUSTINA HUFFMAN N  OR UR  [phone]       Date of Birth Social Security Number Address Home Phone MRN    1950  [address] [id number] [phone] [id number]    Druze Marital Status          Faith        Admission Date Admission Type Admitting Provider Attending Provider Department, Room/Bed    3/7/18 Emergency Minerva Boyle MD Saad, Minerva HAYWOOD MD [facility] INTENSIVE CARE, 386/1    Discharge Date Discharge Disposition Discharge Destination                      Attending Provider: Minerva Boyle MD     Allergies:  No Known Allergies    Isolation:  None   Infection:  None   Code Status:  FULL    Ht:  160 cm (63\")   Wt:  56.7 kg (125 lb)    Admission Cmt:  None   Principal Problem:  None                Active Insurance as of 3/7/2018     Primary Coverage     Payor Plan Insurance Group Employer/Plan Group    [employer]ET[employer] COMMERCIAL AETNA [id number]     Payor Plan Address Payor Plan Phone Number Effective From Effective To    [address] [phone] 1/1/2017     [place] [address]       Subscriber Name Subscriber Birth Date Member ID       MARCIE DECKER 1950 [id number]           Secondary Coverage     Payor Plan Insurance Group Employer/Plan Group    MEDICARE MEDICARE A ONLY      Payor Plan Address Payor Plan Phone Number Effective From Effective To    [address] [phone] 3/1/2016     [place] [address]       Subscriber Name Subscriber Birth Date Member ID       MARCIE DECKER 1950 [id number]                 Emergency Contacts      (Rel.) Home Phone Work Phone Mobile Phone    Gustabo Decker (Spouse) [phone] -- --               History & Physical      Minerva Boyle MD at 3/7/2018  5:01 AM            History and " Physical    Patient Name: Marcie Ricketts  Age/Sex: 67 y.o. female  : 1950  MRN: [id number]    Date of Admission: 3/7/2018  Date of Encounter Visit: 18  Encounter Provider: Minerva Boyle MD  Referring Provider: Minerva Boyle MD  Place of Service: [facility]   Patient Care Team:  Jackie Streeter MD as PCP - General (Internal Medicine)      Subjective:     Chief Complaint: Syncopal episode    History of Present Illness:  Marcie Ricketts is a 67 y.o. female with no prior neurological history he had a sudden onset unprovoked syncopal episode.   Patient was walking her car yesterday and she was trying to get out of the car and she fell and hit her head and hurt her left ankle but she had no loss of consciousness at that time.   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  This was associated with urinary incontinence but there was no motor seizure activity.  Patient herself has no recollection of the events.  Patient did hurt her ankle and she was complaining of ankle pain, patient did hit her head per the 's report and she was complaining of mild lightheadedness but no nausea or vomiting or chest pain or palpitation.  Patient has no underlying history of any arrhythmia or previous syncopal episodes or any history of seizure.  Patient does have history of hypotension which can be a possibly a trigger.  The blood pressure was within normal range on assessment in the emergency room    Pulmonary Functions Testing Results:    No results found for: FEV1, FVC, CGD0VTU, TLC, DLCO    Review of Systems:   Review of Systems  Constitutional: Negative for fever.   HENT: Negative for sore throat.    Eyes: Negative.    Respiratory: Negative for cough and shortness of breath.    Cardiovascular: Negative for chest pain.   Gastrointestinal: Negative for abdominal pain, diarrhea and vomiting.    Genitourinary: Negative for dysuria.   Musculoskeletal: Positive for arthralgias (left ankle). Negative for neck pain.   Skin: Negative for rash.   Allergic/Immunologic: Negative.    Neurological: Positive for syncope and light-headedness. Negative for weakness, numbness and headaches.   Hematological: Negative.    Psychiatric/Behavioral: Negative.    All other systems reviewed and are negative.  Past Medical History:  History reviewed. No pertinent past medical history.  History of hypotension  History reviewed. No pertinent surgical history.    Home Medications:   Prescriptions Prior to Admission   Medication Sig Dispense Refill Last Dose   • CALCIUM PO Take 1 tablet by mouth Daily.      • Multiple Vitamins-Minerals (MULTIVITAMIN ADULT PO) Take 1 tablet by mouth Daily.      • VITAMIN E PO Take 1 tablet by mouth Daily.          Inpatient Medications:  Scheduled Meds:   Continuous Infusions:   PRN Meds:.•  Insert peripheral IV **AND** sodium chloride    Allergies:  No Known Allergies    Past Social History:  Social History     Social History   • Marital status:      Social History Main Topics   • Smoking status: Never Smoker   • Smokeless tobacco: Never Used   • Alcohol use No   • Drug use: No   • Sexual activity: Defer       Past Family History:  History reviewed. No pertinent family history.        Objective:   Temp:  [96.7 °F (35.9 °C)] 96.7 °F (35.9 °C)  Heart Rate:  [] 61  Resp:  [16-18] 18  BP: (101-133)/(69-85) 116/69   SpO2:  [95 %-99 %] 96 %  on  Flow (L/min):  [2] 2 O2 Device: room air  No intake or output data in the 24 hours ending 03/07/18 0501  Body mass index is 22.14 kg/(m^2).  Last 3 weights    03/07/18  0158   Weight: 56.7 kg (125 lb)     Weight change:     Physical Exam:   Physical Exam   General:    No acute distress, alert and oriented x4, pleasant                   Head:    Normocephalic, atraumatic.   Eyes:          Conjunctivae and sclerae normal, no icterus, PERRLA   Throat:    No oral lesions, no thrush, oral mucosa moist.    Neck:   Supple, trachea midline.   Lungs:     Normal chest on inspection, CTAB, no wheezes. No rhonchi. No crackles. Respirations regular, even and unlabored.     Heart:    Regular rhythm and normal rate.  No murmurs, gallops, or rubs noted.   Abdomen:     Soft, non-tender, non-distended, positive bowel sounds.    Extremities:   No clubbing, cyanosis, or edema.     Pulses:   Pulses palpable and equal bilaterally.    Skin:   No bleeding or rash.   Neuro:   Non-focal.  Moves all extremities well.    Psychiatric:   Normal mood and affect.     Lab Review:     Results from last 7 days  Lab Units 03/07/18  0304   SODIUM mmol/L 142   POTASSIUM mmol/L 4.0   CHLORIDE mmol/L 105   CO2 mmol/L 28.0   BUN mg/dL 18   CREATININE mg/dL 0.72   GLUCOSE mg/dL 105*   CALCIUM mg/dL 9.0   AST (SGOT) U/L 23   ALT (SGPT) U/L 26   ALBUMIN g/dL 4.10       Results from last 7 days  Lab Units 03/07/18  0304   TROPONIN T ng/mL <0.010       Results from last 7 days  Lab Units 03/07/18  0304   WBC 10*3/mm3 7.70   HEMOGLOBIN g/dL 13.6   HEMATOCRIT % 42.7   PLATELETS 10*3/mm3 159   MCV fL 93.6   MCH pg 29.8   MCHC g/dL 31.9*   RDW % 13.6*   RDW-SD fl 46.3   MPV fL 11.8   NEUTROPHIL % % 81.0*   LYMPHOCYTE % % 11.3*   MONOCYTES % % 6.8   EOSINOPHIL % % 0.8   BASOPHIL % % 0.1   IMM GRAN % % 0.0   NEUTROS ABS 10*3/mm3 6.24   LYMPHS ABS 10*3/mm3 0.87*   MONOS ABS 10*3/mm3 0.52   EOS ABS 10*3/mm3 0.06   BASOS ABS 10*3/mm3 0.01   IMMATURE GRANS (ABS) 10*3/mm3 0.00           Results from last 7 days  Lab Units 03/07/18  0304   MAGNESIUM mg/dL 2.2           Invalid input(s): LDLCALC                                Results from last 7 days  Lab Units 03/07/18  0317   NITRITE UA  Negative               Imaging:  Imaging Results (most recent)     Procedure Component Value Units Date/Time    CT Head Without Contrast [[id number]] Collected:  03/07/18 0253     Updated:  03/07/18 0254    Narrative:       CT SCAN OF  THE BRAIN WITHOUT CONTRAST.     TECHNIQUE: Radiation dose reduction techniques were utilized, including  automated exposure control and exposure modulation based on body size.  Multiple axial images of the brain were obtained from the vertex to the  base of the brain.     HISTORY: Fainting and syncope.     COMPARISON: No prior studies for comparison.     FINDINGS:   Small amount of subarachnoid hemorrhage layers the high left frontal  lobe. Small amount of hemorrhage is also seen layering the inferior  right frontal lobe in the right temporal lobe. The 0.6 cm focus anterior  to the right lateral ventricle image 28 measures 0.6 cm. No evidence for  ventricular hemorrhage.     Midline structures are within normal limits, there is no hydrocephalus.  Gray-white matter differentiation is maintained.         Orbits are within normal limits. No significant mucosal disease of the  para-nasal sinuses. Mastoid air cells are well aerated.              Impression:       Small amount of subarachnoid blood layers the high left frontal lobe,  lower right frontal lobe and the right temporal lobe.   0.6 cm hyperdense lesion of the right frontal lobe anterior to the right  lateral ventricle of indeterminate significance.  Further evaluation with MRI is recommended when the patient is more  stable.     Findings called to Dr. Luna, 2:51 AM.       XR Chest 2 View [[id number]] Collected:  03/07/18 0259     Updated:  03/07/18 0259    Narrative:       CHEST PA AND LATERAL.     HISTORY: Trauma.     COMPARISON: No prior studies for comparison.     FINDINGS:  Cardiomediastinal silhouette is within normal limits.         There is no consolidation or effusion.              Impression:       No acute findings.           XR Ankle 3+ View Left [[id number]] Collected:  03/07/18 0338     Updated:  03/07/18 0338    Narrative:       LEFT ANKLE 3 VIEWS.     HISTORY: Ankle pain, fall.     COMPARISON: No prior studies for comparison.      FINDINGS:  There is no fracture or dislocation.      Mild soft tissue swelling.       Impression:       No acute fracture or dislocation.                 I personally viewed and interpreted the patient's imaging studies.    Assessment:   1. Traumatic subarachnoid hemorrhage  2. Syncopal episode for further evaluation        Plan:     Consultation and evaluation by neurosurgery, since bilateral subarachnoid hemorrhage, this is preceded by a loss of consciousness and trauma and unlikely to be aneurysm related.  Neurosurgery on board, no intervention at this point with follow-up CT imaging  Workup of syncope, we'll consult cardiology, patient has history of hypertension and this might be as simple as an orthostatic hypotension episode however other more serious etiologies need to be evaluated and ruled out  Patient is not septic, patient has no renal problems, no leukocytosis  Mechanical DVT prophylaxis, no need for stress ulcer prophylaxis    Discussed with patient and discussed with ER team and with the ICU staff    Minerva Boyle MD  [facility]   03/07/18  5:01 AM    Dictated utilizing Dragon dictation:  Much of this encounter note is an electronic transcription/translation of spoken language to printed text. The electronic translation of spoken language may permit erroneous, or at times, nonsensical words or phrases to be inadvertently transcribed; Although I have reviewed the note for such errors, some may still exist.           Electronically signed by Minerva Boyle MD at 3/7/2018  5:24 AM           Emergency Department Notes      Franchesca Houston RN at 3/7/2018  1:55 AM          Ems reports call was for syncopal episode.  told EMS that pt was up making coffee when he heard her fall on the floor.   reports pt was unconscious for a few minutes.  Pt did loose control of her bladder.  Pt doesn't remember the episode.  Pt complains of left ankle pain.  EMS placed pt in c-collar.        Electronically signed by Franchesca Houston RN at 3/7/2018  1:57 AM      Skylar Schaffer RN at 3/7/2018  2:13 AM          Pt had syncopal episode while making coffee around 100am this morning. Fell and LOC.     Skylar Schaffer RN  03/07/18 0214       Electronically signed by Skylar Schaffer RN at 3/7/2018  2:14 AM      Johnathan Luna MD at 3/7/2018  2:14 AM      Procedure Orders:    1. Critical Care [[id number]] ordered by Johnathan Luna MD at 03/07/18 0302                 EMERGENCY DEPARTMENT ENCOUNTER    CHIEF COMPLAINT  Chief Complaint: Syncope  History given by: patient,   History limited by: n/a  Room Number: 13/13  PMD: Jackie Streeter MD      HPI:  Pt is a 67 y.o. female who presents after a syncopal episode tonight. Per pt's , the pt was up making coffee when her heard her fall. He states that she was unresponsive for several minutes. Pt had urinary incontinence during the episode. Pt is amnesic to event. Currently, pt complains of left ankle pain s/t a mechanical fall yesterday. Her  states that the pt struck her head. She reports lightheadedness, but denies nausea, vomiting, CP, palpitations, or any other sx. Pt denies hx of similar episodes, but states that she has a hx of hypotension     Duration:  Prior to arrival   Onset: sudden  Timing: single episode   Location: neuro  Radiation: n/a  Quality: syncope and collapse   Intensity/Severity: moderate  Progression: resolved   Associated Symptoms: left ankle pain, lightheadedness  Aggravating Factors: none  Alleviating Factors: none  Previous Episodes: hx of hypotension   Treatment before arrival: none    PAST MEDICAL HISTORY  Active Ambulatory Problems     Diagnosis Date Noted   • No Active Ambulatory Problems     Resolved Ambulatory Problems     Diagnosis Date Noted   • No Resolved Ambulatory Problems     No Additional Past Medical History       PAST SURGICAL HISTORY  History reviewed. No pertinent surgical history.    FAMILY  HISTORY  History reviewed. No pertinent family history.    SOCIAL HISTORY  Social History     Social History   • Marital status:      Spouse name: N/A   • Number of children: N/A   • Years of education: N/A     Occupational History   • Not on file.     Social History Main Topics   • Smoking status: Never Smoker   • Smokeless tobacco: Never Used   • Alcohol use No   • Drug use: No   • Sexual activity: Defer     Other Topics Concern   • Not on file     Social History Narrative   • No narrative on file       ALLERGIES  Review of patient's allergies indicates no known allergies.    REVIEW OF SYSTEMS  Review of Systems   Constitutional: Negative for fever.   HENT: Negative for sore throat.    Eyes: Negative.    Respiratory: Negative for cough and shortness of breath.    Cardiovascular: Negative for chest pain.   Gastrointestinal: Negative for abdominal pain, diarrhea and vomiting.   Genitourinary: Negative for dysuria.   Musculoskeletal: Positive for arthralgias (left ankle). Negative for neck pain.   Skin: Negative for rash.   Allergic/Immunologic: Negative.    Neurological: Positive for syncope and light-headedness. Negative for weakness, numbness and headaches.   Hematological: Negative.    Psychiatric/Behavioral: Negative.    All other systems reviewed and are negative.      PHYSICAL EXAM  ED Triage Vitals   Temp Heart Rate Resp BP SpO2   03/07/18 0158 03/07/18 0157 03/07/18 0157 03/07/18 0157 03/07/18 0157   96.7 °F (35.9 °C) 66 18 101/71 99 %      Temp src Heart Rate Source Patient Position BP Location FiO2 (%)   03/07/18 0158 03/07/18 0157 -- -- --   Tympanic Monitor          Physical Exam   Constitutional: She is oriented to person, place, and time and well-developed, well-nourished, and in no distress. No distress.   HENT:   Head: Normocephalic and atraumatic.   Eyes: EOM are normal. Pupils are equal, round, and reactive to light.   Neck: Normal range of motion. Neck supple.   Cardiovascular: Normal rate,  regular rhythm and normal heart sounds.    Pulmonary/Chest: Effort normal and breath sounds normal. No respiratory distress.   Abdominal: Soft. There is no tenderness. There is no rebound and no guarding.   Musculoskeletal: Normal range of motion. She exhibits no edema.        Left lower leg: She exhibits tenderness.   Neurological: She is alert and oriented to person, place, and time. She has normal sensation and normal strength.   Skin: Skin is warm and dry. No rash noted.   Psychiatric: Mood and affect normal.   Nursing note and vitals reviewed.      LAB RESULTS  Lab Results (last 24 hours)     ** No results found for the last 24 hours. **          I ordered the above labs and reviewed the results     RADIOLOGY  XR Chest 2 View   Preliminary Result   No acute findings.              CT Head Without Contrast   Preliminary Result   Small amount of subarachnoid blood layers the high left frontal lobe,   lower right frontal lobe and the right temporal lobe.    0.6 cm hyperdense lesion of the right frontal lobe anterior to the right   lateral ventricle of indeterminate significance.   Further evaluation with MRI is recommended when the patient is more   stable.       Findings called to Dr. Rand, 2:51 AM.          XR Ankle 3+ View Left    (Results Pending)        I ordered the above noted radiological studies. Interpreted by radiologist. Discussed with radiologist (Dr Bethea). Reviewed by me in PACS.       PROCEDURES  Critical Care  Performed by: JOSEPHINE RAND  Authorized by: JOSEPHINE RAND     Critical care provider statement:     Critical care time (minutes):  30    Critical care time was exclusive of:  Separately billable procedures and treating other patients and teaching time    Critical care was necessary to treat or prevent imminent or life-threatening deterioration of the following conditions:  CNS failure or compromise    Critical care was time spent personally by me on the following activities:  Discussions  with consultants, development of treatment plan with patient or surrogate, evaluation of patient's response to treatment, examination of patient, obtaining history from patient or surrogate, ordering and performing treatments and interventions, ordering and review of laboratory studies, ordering and review of radiographic studies and re-evaluation of patient's condition          EKG           EKG time: 02:58  Rhythm/Rate: NSR 65  P waves and GA: nml  QRS, axis: nml   ST and T waves: nml     Interpreted Contemporaneously by me, independently viewed  No prior     PROGRESS AND CONSULTS  ED Course     02;21  BP- 101/71 HR- 66 Temp- 96.7 °F (35.9 °C) (Tympanic) O2 sat- 99%  Adivsed pt of the plan fr labs and CT. Pt understands and agrees with the plan, all questions answered.    02;32  Magnesium, phosphorus, d-dimer, troponin, UA, CMP, CBC, CXR, CT head, and EKG ordered.     02:57  Call placed to pulmonology and neurosurgery for admission and consult.     02;58  Discussed pt's case with Dr Boyle (pulmonology), who agrees to admit the pt to the ICU    03;02  XR left ankle ordered.     03:03  Discussed pt's case with Dr Tabor (neursurgery), who agrees to consult.    03:31  BP- 111/73 HR- (!) 129 Temp- 96.7 °F (35.9 °C) (Tympanic) O2 sat- 98%  Rechecked the patient who is in NAD and is resting comfortably. Advised pt and  that the CT shows a subarachnoid hemorrhage. Informed them of the plan for admission. Pt understands and agrees with the plan, all questions answered.    MEDICAL DECISION MAKING  Results were reviewed/discussed with the patient and they were also made aware of online access. Pt also made aware that some labs, such as cultures, will not be resulted during ER visit and follow up with PMD is necessary.     MDM  Number of Diagnoses or Management Options  Syncope, cardiogenic:      Amount and/or Complexity of Data Reviewed  Clinical lab tests: ordered and reviewed  Tests in the radiology section of CPT®:   ordered and reviewed (CT head shows a small amount of subarachnoid blood layers the high left frontal lobe, lower right frontal lobe and the right temporal lobe.   CXR shows NAD  )  Tests in the medicine section of CPT®:  ordered and reviewed (See EKG procedure note. )  Discussion of test results with the performing providers: yes (Dr Bethea)  Discuss the patient with other providers: yes (Dr Boyle, pulmonology  Dr Tabor, neurosurgery)    Critical Care  Total time providing critical care: 30-74 minutes    Patient Progress  Patient progress: stable         Jo and Plunkett Grade:  The Jo and Plunkett grade for this patient is: Grade 1: Asymptomatic or mild headache and slight nuchal rigidity at 2:59 AM on 03/07/18.    DIAGNOSIS  Final diagnoses:   Syncope, cardiogenic   Subarachnoid bleed       DISPOSITION  ADMISSION    Discussed treatment plan and reason for admission with pt/family and admitting physician.  Pt/family voiced understanding of the plan for admission for further testing/treatment as needed.     Latest Documented Vital Signs:  As of 3:06 AM  BP- 123/75 HR- 71 Temp- 96.7 °F (35.9 °C) (Tympanic) O2 sat- 95%    --  Documentation assistance provided by julius Odell for Dr Luna.  Information recorded by the scribe was done at my direction and has been verified and validated by me.       Ariane Odell  03/07/18 0332       Johnathan Luna MD  03/07/18 0348       Electronically signed by Johnathan Luna MD at 3/7/2018  3:48 AM        Lines, Drains & Airways    Active LDAs     Name:   Placement date:   Placement time:   Site:   Days:    Peripheral IV Line - Single Lumen 03/07/18 0157 median vein (underside of arm), left 20 gauge  03/07/18    0157      1         Inactive LDAs     None                Hospital Medications (all)       Dose Frequency Start End    acetaminophen (TYLENOL) suppository 650 mg 650 mg Every 4 Hours PRN 3/7/2018     Sig - Route: Insert 1 suppository into the rectum Every 4 (Four) Hours As  "Needed for Fever (temperature greater than 101.5 F or headache). - Rectal    Linked Group 1:  \"Or\" Linked Group Details        acetaminophen (TYLENOL) tablet 650 mg 650 mg Every 4 Hours PRN 3/7/2018     Sig - Route: Take 2 tablets by mouth Every 4 (Four) Hours As Needed for Headache or Fever (fever greater than 101.5 F). - Oral    Linked Group 1:  \"Or\" Linked Group Details        bisacodyl (DULCOLAX) EC tablet 5 mg 5 mg Daily PRN 3/7/2018     Sig - Route: Take 1 tablet by mouth Daily As Needed for Constipation. - Oral    bisacodyl (DULCOLAX) suppository 10 mg 10 mg Daily PRN 3/7/2018     Sig - Route: Insert 1 suppository into the rectum Daily As Needed for Constipation. - Rectal    iopamidol (ISOVUE-300) 61 % injection 100 mL 100 mL Once in Imaging 3/7/2018 3/7/2018    Sig - Route: Infuse 100 mL into a venous catheter Once. - Intravenous    ondansetron (ZOFRAN) injection 4 mg 4 mg Every 6 Hours PRN 3/7/2018     Sig - Route: Infuse 2 mL into a venous catheter Every 6 (Six) Hours As Needed for Nausea or Vomiting. - Intravenous    Linked Group 2:  \"Or\" Linked Group Details        ondansetron (ZOFRAN) tablet 4 mg 4 mg Every 6 Hours PRN 3/7/2018     Sig - Route: Take 1 tablet by mouth Every 6 (Six) Hours As Needed for Nausea or Vomiting. - Oral    Linked Group 2:  \"Or\" Linked Group Details        ondansetron ODT (ZOFRAN-ODT) disintegrating tablet 4 mg 4 mg Every 6 Hours PRN 3/7/2018     Sig - Route: Take 1 tablet by mouth Every 6 (Six) Hours As Needed for Nausea or Vomiting. - Oral    Linked Group 2:  \"Or\" Linked Group Details        sodium chloride 0.9 % flush 1-10 mL 1-10 mL As Needed 3/7/2018     Sig - Route: Infuse 1-10 mL into a venous catheter As Needed for Line Care. - Intravenous    sodium chloride 0.9 % flush 10 mL 10 mL As Needed 3/7/2018     Sig - Route: Infuse 10 mL into a venous catheter As Needed for Line Care. - Intravenous    Linked Group 3:  \"And\" Linked Group Details        sodium chloride 0.9 % " infusion 100 mL/hr Continuous 3/7/2018     Sig - Route: Infuse 100 mL/hr into a venous catheter Continuous. - Intravenous          Orders (last 24 hrs)     Start     Ordered    03/08/18 1007  PT Consult: Eval & Treat  Once      03/08/18 1006    03/08/18 0932  Holter Monitor - 72 Hour Up To 21 Days  Once      03/08/18 0931    03/08/18 0739  POC Glucose Once  Once      03/08/18 0738    03/08/18 0500  ECG 12 Lead  Tomorrow AM      03/07/18 1051    03/08/18 0358  POC Glucose Once  Once      03/08/18 0357    03/08/18 0056  POC Glucose Once  Once      03/08/18 0055    03/07/18 2057  POC Glucose Once  Once      03/07/18 2056    03/07/18 2000  Neuro Checks  Every 4 Hours      03/07/18 1728    03/07/18 1808  See Evelin Butler's note for discharge instructions at the time of patient's discharge.  Nursing Communication  Once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

## 2018-03-08 NOTE — THERAPY DISCHARGE NOTE
"Acute Care - Physical Therapy Initial Eval/Discharge  [facility]     Patient Name: Marcie Ricketts  : 1950  MRN: [id number]  Today's Date: 3/8/2018   Onset of Illness/Injury or Date of Surgery Date: 18            Admit Date: 3/7/2018    Visit Dx:    ICD-10-CM ICD-9-CM   1. Syncope, cardiogenic R55 780.2   2. Subarachnoid bleed I60.9 430     Patient Active Problem List   Diagnosis   • Syncope, cardiogenic   • Traumatic subarachnoid hemorrhage with loss of consciousness of 30 minutes or less     Past Medical History:   Diagnosis Date   • Hypotension      History reviewed. No pertinent surgical history.       PT ASSESSMENT (last 72 hours)      PT Evaluation       18 1137 18 0400    Rehab Evaluation    Document Type evaluation  -EM     Subjective Information agree to therapy;no complaints  -EM     General Information    Onset of Illness/Injury or Date of Surgery Date 18  -EM     General Observations  female sitting up in chair, awake and alert  -EM     Pertinent History Of Current Problem syncope, fall x2 with traumatic SDH  -EM     Prior Level of Function independent:;community mobility  -EM     Equipment Currently Used at Home none  -EM none  -    Plans/Goals Discussed With patient  -EM     Living Environment    Lives With spouse  -EM spouse  -JM    Living Arrangements house  -EM house  -JM    Home Accessibility no concerns  -EM no concerns  -JM    Stair Railings at Home  none  -JM    Type of Financial/Environmental Concern  none  -    Transportation Available  family or friend will provide  -JM    Living Environment Comment  none  -JM    Clinical Impression    Patient/Family Goals Statement go home, return to prior activities   -EM     Criteria for Skilled Therapeutic Interventions Met no problems identified which require skilled intervention  -EM     Pain Assessment    Pain Assessment No/denies pain   states L ankle is \"sore\", sprained when fell  -EM     Cognitive " Assessment/Intervention    Current Cognitive/Communication Assessment functional  -EM     Orientation Status oriented x 4  -EM     Follows Commands/Answers Questions 100% of the time  -EM     Personal Safety WNL/WFL  -EM     ROM (Range of Motion)    General ROM no range of motion deficits identified  -EM     MMT (Manual Muscle Testing)    General MMT Assessment no strength deficits identified   L ankle xrays negative, L ankle bruised and swollen  -EM     Muscle Tone Assessment    Muscle Tone Assessment  Bilateral Upper Extremities;Bilateral Lower Extremities  -    Bed Mobility, Assessment/Treatment    Bed Mob, Supine to Sit, [place] not tested  -EM     Bed Mob, Sit to Supine, [place] not tested  -EM     Transfer Assessment/Treatment    Transfers, Sit-Stand [place] independent  -EM     Transfers, Stand-Sit [place] independent  -EM     Gait Assessment/Treatment    Gait, [place] Level stand by assist  -EM     Gait, Distance (Feet) 300  -EM     Gait, Gait Deviations left:;antalgic  -EM     Motor Skills/Interventions    Additional Documentation Balance Skills Training (Group)  -EM     Balance Skills Training    Sitting-Level of Assistance Independent  -EM     Standing-Level of Assistance Independent  -EM     Gait Balance-Level of Assistance Distant supervision  -EM     Positioning and Restraints    Pre-Treatment Position sitting in chair/recliner  -EM     Post Treatment Position chair  -EM     In Chair reclined;call light within reach;with family/caregiver  -EM       User Key  (r) = Recorded By, (t) = Taken By, (c) = Cosigned By    Initials Name Provider Type    EM Dlefina Anderson, PT Physical Therapist    PHILL White, RN Registered Nurse          Physical Therapy Education     Title: PT OT SLP Therapies (Resolved)     Topic: Physical Therapy (Resolved)     Point: Mobility training (Resolved)    Learning Progress Summary    Learner Readiness Method Response Comment Documented  by Status   Patient NAILA Mart  EM 03/08/18 1142 Done                      User Key     Initials Effective Dates Name Provider Type Discipline    EM 12/01/15 -  Delfina Anderson PT Physical Therapist PT                PT Recommendation and Plan  Anticipated Discharge Disposition: home with assist  PT Frequency: evaluation only  Plan of Care Review  Outcome Summary/Follow up Plan: patient presents s/p fall x2 with SDH. Patient able to ambulate 300 feet without difficulty other than antalgic gait on LLE due to L ankle sprain. Patient demonstrates no balance deficits, no strength deficits and does not need any skilled PT at this time.               Outcome Measures       03/08/18 1100          How much help from another person do you currently need...    Turning from your back to your side while in flat bed without using bedrails? 4  -EM      Moving from lying on back to sitting on the side of a flat bed without bedrails? 4  -EM      Moving to and from a bed to a chair (including a wheelchair)? 4  -EM      Standing up from a chair using your arms (e.g., wheelchair, bedside chair)? 4  -EM      Climbing 3-5 steps with a railing? 3  -EM      To walk in hospital room? 4  -EM      AM-PAC 6 Clicks Score 23  -EM      Functional Assessment    Outcome Measure Options AM-PAC 6 Clicks Basic Mobility (PT)  -EM        User Key  (r) = Recorded By, (t) = Taken By, (c) = Cosigned By    Initials Name Provider Type    EM Delfina Anderson PT Physical Therapist           Time Calculation:         PT Charges       03/08/18 1146          Time Calculation    Start Time 1100  -EM      Stop Time 1113  -EM      Time Calculation (min) 13 min  -EM      PT Received On 03/08/18  -EM        User Key  (r) = Recorded By, (t) = Taken By, (c) = Cosigned By    Initials Name Provider Type    EM Delfina Anderson, PT Physical Therapist          Therapy Charges for Today     Code Description Service Date Service Provider Modifiers Qty     40402030858 HC PT EVAL LOW COMPLEXITY 1 3/8/2018 Delfina Anderson, PT GP 1          PT G-Codes  Outcome Measure Options: AM-PAC 6 Clicks Basic Mobility (PT)    PT Discharge Summary  Anticipated Discharge Disposition: home with assist    Delfina Anderson, PT  3/8/2018

## 2018-03-08 NOTE — PLAN OF CARE
Problem: Patient Care Overview (Adult)  Goal: Plan of Care Review   03/08/18 1143   Outcome Evaluation   Outcome Summary/Follow up Plan patient presents s/p fall x2 with SDH. Patient able to ambulate 300 feet without difficulty other than antalgic gait on LLE due to L ankle sprain. Patient demonstrates no balance deficits, no strength deficits and does not need any skilled PT at this time.

## 2018-03-08 NOTE — DISCHARGE INSTR - ACTIVITY
Our doctors advise that you remain off work for 2 weeks, do not drive for 2 weeks, do not take medicines containing aspirin, ibuprofen, naproxen, Advil, or Aleeve, or other NSAIDs. Wear the heart monitor for 2 weeks and follow the package instructions.     Diet as tolerated.    Physical activity as tolerated.
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
## 2018-03-27 PROCEDURE — 0298T HOLTER MONITOR - 72 HOUR UP TO 21 DAY: CPT | Performed by: INTERNAL MEDICINE

## 2018-04-20 ENCOUNTER — OFFICE VISIT (OUTPATIENT)
Dept: CARDIOLOGY | Facility: CLINIC | Age: 68
End: 2018-04-20

## 2018-04-20 VITALS
HEART RATE: 67 BPM | HEIGHT: 63 IN | SYSTOLIC BLOOD PRESSURE: 130 MMHG | WEIGHT: 134.8 LBS | BODY MASS INDEX: 23.88 KG/M2 | DIASTOLIC BLOOD PRESSURE: 78 MMHG

## 2018-04-20 DIAGNOSIS — S06.6X1A TRAUMATIC SUBARACHNOID HEMORRHAGE WITH LOSS OF CONSCIOUSNESS OF 30 MINUTES OR LESS, INITIAL ENCOUNTER (HCC): Primary | ICD-10-CM

## 2018-04-20 PROBLEM — I60.9 SAH (SUBARACHNOID HEMORRHAGE) (HCC): Status: ACTIVE | Noted: 2018-04-20

## 2018-04-20 PROBLEM — R55 SYNCOPE, CARDIOGENIC: Status: RESOLVED | Noted: 2018-03-07 | Resolved: 2018-04-20

## 2018-04-20 PROCEDURE — 93000 ELECTROCARDIOGRAM COMPLETE: CPT | Performed by: INTERNAL MEDICINE

## 2018-04-20 PROCEDURE — 99212 OFFICE O/P EST SF 10 MIN: CPT | Performed by: INTERNAL MEDICINE
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
